# Patient Record
Sex: MALE | URBAN - METROPOLITAN AREA
[De-identification: names, ages, dates, MRNs, and addresses within clinical notes are randomized per-mention and may not be internally consistent; named-entity substitution may affect disease eponyms.]

---

## 2020-09-14 ENCOUNTER — WALK IN (OUTPATIENT)
Dept: URGENT CARE | Age: 45
End: 2020-09-14

## 2020-09-14 VITALS
SYSTOLIC BLOOD PRESSURE: 123 MMHG | HEART RATE: 72 BPM | RESPIRATION RATE: 16 BRPM | HEIGHT: 71 IN | OXYGEN SATURATION: 98 % | DIASTOLIC BLOOD PRESSURE: 82 MMHG | TEMPERATURE: 97.5 F

## 2020-09-14 DIAGNOSIS — R07.9 CHEST PAIN, UNSPECIFIED TYPE: Primary | ICD-10-CM

## 2020-09-14 PROCEDURE — 99203 OFFICE O/P NEW LOW 30 MIN: CPT | Performed by: NURSE PRACTITIONER

## 2020-09-14 PROCEDURE — 93000 ELECTROCARDIOGRAM COMPLETE: CPT | Performed by: NURSE PRACTITIONER

## 2020-09-14 SDOH — HEALTH STABILITY: MENTAL HEALTH: HOW OFTEN DO YOU HAVE A DRINK CONTAINING ALCOHOL?: 2-3 TIMES A WEEK

## 2020-09-14 SDOH — HEALTH STABILITY: MENTAL HEALTH: HOW MANY STANDARD DRINKS CONTAINING ALCOHOL DO YOU HAVE ON A TYPICAL DAY?: 1 OR 2

## 2020-09-14 SDOH — HEALTH STABILITY: MENTAL HEALTH: HOW OFTEN DO YOU HAVE 6 OR MORE DRINKS ON ONE OCCASION?: NEVER

## 2020-09-14 ASSESSMENT — ENCOUNTER SYMPTOMS: HEADACHES: 1

## 2020-09-15 ENCOUNTER — TELEPHONE (OUTPATIENT)
Dept: URGENT CARE | Age: 45
End: 2020-09-15

## 2021-02-27 ENCOUNTER — WALK IN (OUTPATIENT)
Dept: URGENT CARE | Age: 46
End: 2021-02-27

## 2021-02-27 DIAGNOSIS — R09.81 NASAL CONGESTION: ICD-10-CM

## 2021-02-27 DIAGNOSIS — J01.40 SUBACUTE PANSINUSITIS: Primary | ICD-10-CM

## 2021-02-27 PROCEDURE — U0003 INFECTIOUS AGENT DETECTION BY NUCLEIC ACID (DNA OR RNA); SEVERE ACUTE RESPIRATORY SYNDROME CORONAVIRUS 2 (SARS-COV-2) (CORONAVIRUS DISEASE [COVID-19]), AMPLIFIED PROBE TECHNIQUE, MAKING USE OF HIGH THROUGHPUT TECHNOLOGIES AS DESCRIBED BY CMS-2020-01-R: HCPCS | Performed by: NURSE PRACTITIONER

## 2021-02-27 PROCEDURE — U0005 INFEC AGEN DETEC AMPLI PROBE: HCPCS | Performed by: NURSE PRACTITIONER

## 2021-02-27 PROCEDURE — 99214 OFFICE O/P EST MOD 30 MIN: CPT | Performed by: NURSE PRACTITIONER

## 2021-02-27 RX ORDER — AMOXICILLIN AND CLAVULANATE POTASSIUM 875; 125 MG/1; MG/1
1 TABLET, FILM COATED ORAL EVERY 12 HOURS
Qty: 20 TABLET | Refills: 0 | Status: SHIPPED | OUTPATIENT
Start: 2021-02-27

## 2021-02-27 SDOH — HEALTH STABILITY: MENTAL HEALTH: HOW MANY STANDARD DRINKS CONTAINING ALCOHOL DO YOU HAVE ON A TYPICAL DAY?: 1 OR 2

## 2021-02-27 SDOH — HEALTH STABILITY: MENTAL HEALTH: HOW OFTEN DO YOU HAVE A DRINK CONTAINING ALCOHOL?: 2-3 TIMES A WEEK

## 2021-02-27 SDOH — HEALTH STABILITY: MENTAL HEALTH: HOW OFTEN DO YOU HAVE 6 OR MORE DRINKS ON ONE OCCASION?: NEVER

## 2021-02-27 ASSESSMENT — ENCOUNTER SYMPTOMS
EYE ITCHING: 0
CHEST TIGHTNESS: 0
ANOREXIA: 0
TREMORS: 0
SWOLLEN GLANDS: 0
BACK PAIN: 0
CHOKING: 0
EYE PAIN: 0
WEAKNESS: 0
ABDOMINAL PAIN: 0
EYE DISCHARGE: 0
EYE REDNESS: 0
FEVER: 0
NAUSEA: 0
UNEXPECTED WEIGHT CHANGE: 0
HEADACHES: 0
ANAL BLEEDING: 0
FACIAL SWELLING: 0
VERTIGO: 0
DIZZINESS: 0
APPETITE CHANGE: 0
SORE THROAT: 0
CHANGE IN BOWEL HABIT: 0
DIAPHORESIS: 0
VISUAL CHANGE: 0
COLOR CHANGE: 0
COUGH: 0
CHILLS: 0
DIARRHEA: 0
ACTIVITY CHANGE: 0
TROUBLE SWALLOWING: 0
STRIDOR: 0
RECTAL PAIN: 0
CONFUSION: 0
VOICE CHANGE: 0
NUMBNESS: 0
AGITATION: 0
ABDOMINAL DISTENTION: 0
BLOOD IN STOOL: 0
CONSTIPATION: 0
FATIGUE: 0
SINUS PAIN: 1
WHEEZING: 0
APNEA: 0
VOMITING: 0
RHINORRHEA: 1
SHORTNESS OF BREATH: 0
SINUS PRESSURE: 0

## 2021-02-27 ASSESSMENT — PAIN SCALES - GENERAL: PAINLEVEL: 0

## 2021-02-28 LAB
SARS-COV-2 RNA RESP QL NAA+PROBE: NOT DETECTED
SERVICE CMNT-IMP: NORMAL
SERVICE CMNT-IMP: NORMAL

## 2021-03-01 ENCOUNTER — TELEPHONE (OUTPATIENT)
Dept: URGENT CARE | Age: 46
End: 2021-03-01

## 2021-04-15 ENCOUNTER — OFFICE VISIT (OUTPATIENT)
Dept: FAMILY MEDICINE CLINIC | Facility: CLINIC | Age: 46
End: 2021-04-15
Payer: COMMERCIAL

## 2021-04-15 VITALS
HEIGHT: 70.47 IN | SYSTOLIC BLOOD PRESSURE: 110 MMHG | RESPIRATION RATE: 17 BRPM | BODY MASS INDEX: 33.13 KG/M2 | TEMPERATURE: 99 F | HEART RATE: 102 BPM | DIASTOLIC BLOOD PRESSURE: 88 MMHG | WEIGHT: 234 LBS

## 2021-04-15 DIAGNOSIS — M99.01 CERVICOTHORACIC SOMATIC DYSFUNCTION: ICD-10-CM

## 2021-04-15 DIAGNOSIS — R09.81 NASAL SINUS CONGESTION: ICD-10-CM

## 2021-04-15 DIAGNOSIS — J30.9 ALLERGIC RHINITIS, UNSPECIFIED SEASONALITY, UNSPECIFIED TRIGGER: ICD-10-CM

## 2021-04-15 DIAGNOSIS — R05.8 ALLERGIC COUGH: ICD-10-CM

## 2021-04-15 DIAGNOSIS — M99.03 SOMATIC DYSFUNCTION OF LUMBAR REGION: ICD-10-CM

## 2021-04-15 DIAGNOSIS — J45.40 MODERATE PERSISTENT REACTIVE AIRWAY DISEASE WITHOUT COMPLICATION: Primary | ICD-10-CM

## 2021-04-15 PROBLEM — Z76.89 ESTABLISHING CARE WITH NEW DOCTOR, ENCOUNTER FOR: Status: ACTIVE | Noted: 2021-04-15

## 2021-04-15 PROCEDURE — 3074F SYST BP LT 130 MM HG: CPT | Performed by: FAMILY MEDICINE

## 2021-04-15 PROCEDURE — 99204 OFFICE O/P NEW MOD 45 MIN: CPT | Performed by: FAMILY MEDICINE

## 2021-04-15 PROCEDURE — 98926 OSTEOPATH MANJ 3-4 REGIONS: CPT | Performed by: FAMILY MEDICINE

## 2021-04-15 PROCEDURE — 3079F DIAST BP 80-89 MM HG: CPT | Performed by: FAMILY MEDICINE

## 2021-04-15 PROCEDURE — 3008F BODY MASS INDEX DOCD: CPT | Performed by: FAMILY MEDICINE

## 2021-04-15 RX ORDER — ALBUTEROL SULFATE 2.5 MG/3ML
2.5 SOLUTION RESPIRATORY (INHALATION) EVERY 6 HOURS PRN
Qty: 1 BOX | Refills: 5 | Status: SHIPPED | OUTPATIENT
Start: 2021-04-15

## 2021-04-15 RX ORDER — DEXTROMETHORPHAN HYDROBROMIDE AND PROMETHAZINE HYDROCHLORIDE 15; 6.25 MG/5ML; MG/5ML
5 SYRUP ORAL 4 TIMES DAILY PRN
Qty: 240 ML | Refills: 0 | Status: SHIPPED | OUTPATIENT
Start: 2021-04-15 | End: 2021-04-24

## 2021-04-15 RX ORDER — FLUTICASONE PROPIONATE 50 MCG
2 SPRAY, SUSPENSION (ML) NASAL DAILY
COMMUNITY
End: 2021-05-12

## 2021-04-15 RX ORDER — METHYLPREDNISOLONE 4 MG/1
TABLET ORAL
Qty: 1 KIT | Refills: 0 | Status: SHIPPED | OUTPATIENT
Start: 2021-04-15 | End: 2021-05-24 | Stop reason: ALTCHOICE

## 2021-04-15 RX ORDER — ASPIRIN 81 MG/1
81 TABLET ORAL DAILY
COMMUNITY

## 2021-04-15 NOTE — PROGRESS NOTES
HPI/Subjective:   Patient ID: Estela Rosales is a 39year old male.     This patient is a 72-year-old -American male who is presenting for establishing care with a new physician with unfortunately a recent onset of allergy and sinus symptoms which inclu Normal breath sounds. Musculoskeletal:      Cervical back: Spasms and tenderness present. Decreased range of motion. Thoracic back: Spasms and tenderness present. Decreased range of motion. Lumbar back: Spasms and tenderness present.  Decreased All adult screening ordered and done appropriate for patient's age and gender and risk factors and complaints. Medication reviewed and renewed where needed and appropriate. Comply with medications. Monitor blood pressures and record at home.  Limit sal

## 2021-04-16 ENCOUNTER — PATIENT MESSAGE (OUTPATIENT)
Dept: FAMILY MEDICINE CLINIC | Facility: CLINIC | Age: 46
End: 2021-04-16

## 2021-04-18 NOTE — TELEPHONE ENCOUNTER
Please advise. Rx pended. LOV 4/15/21    From: [facility]  To:  Iker Lu DO  Sent: 4/16/2021  4:50 PM CDT  Subject: Prescription Question    Good afternoon Dr. Petra Green    I'm a leaving for a couple of days and didn't want to take the Kids neb

## 2021-04-19 RX ORDER — NEBULIZER ACCESSORIES
KIT MISCELLANEOUS
Qty: 1 KIT | Refills: 0 | Status: SHIPPED | OUTPATIENT
Start: 2021-04-19

## 2021-04-24 ENCOUNTER — LAB ENCOUNTER (OUTPATIENT)
Dept: LAB | Age: 46
End: 2021-04-24
Attending: FAMILY MEDICINE
Payer: COMMERCIAL

## 2021-04-24 DIAGNOSIS — J45.40 MODERATE PERSISTENT REACTIVE AIRWAY DISEASE WITHOUT COMPLICATION: ICD-10-CM

## 2021-04-24 DIAGNOSIS — R05.8 ALLERGIC COUGH: ICD-10-CM

## 2021-04-25 RX ORDER — DEXTROMETHORPHAN HYDROBROMIDE AND PROMETHAZINE HYDROCHLORIDE 15; 6.25 MG/5ML; MG/5ML
5 SYRUP ORAL 4 TIMES DAILY PRN
Qty: 240 ML | Refills: 0 | Status: SHIPPED | OUTPATIENT
Start: 2021-04-25

## 2021-04-27 ENCOUNTER — HOSPITAL ENCOUNTER (OUTPATIENT)
Dept: RESPIRATORY THERAPY | Facility: HOSPITAL | Age: 46
Discharge: HOME | End: 2021-04-27
Attending: FAMILY MEDICINE
Payer: COMMERCIAL

## 2021-04-27 DIAGNOSIS — J45.40 MODERATE PERSISTENT REACTIVE AIRWAY DISEASE WITHOUT COMPLICATION: ICD-10-CM

## 2021-04-27 PROCEDURE — 94726 PLETHYSMOGRAPHY LUNG VOLUMES: CPT | Performed by: INTERNAL MEDICINE

## 2021-04-27 PROCEDURE — 94729 DIFFUSING CAPACITY: CPT | Performed by: INTERNAL MEDICINE

## 2021-04-27 PROCEDURE — 94060 EVALUATION OF WHEEZING: CPT | Performed by: INTERNAL MEDICINE

## 2021-05-04 NOTE — ADDENDUM NOTE
Encounter addended by: Renzo Rider MD on: 5/4/2021 2:48 PM   Actions taken: Clinical Note Signed, Charge Capture section accepted

## 2021-05-04 NOTE — PROCEDURES
[facility]    PFT Interpretation    Irineo Hurst     1975 MRN [id number]   Height  71 inh  Age 39year old   Weight  234 lbs  Sex Male         Spirometry:   FEV1 2.89 L which is 73%  FEV1/FVC ratio 77%  No significant bronc

## 2021-05-12 ENCOUNTER — OFFICE VISIT (OUTPATIENT)
Dept: ALLERGY | Facility: CLINIC | Age: 46
End: 2021-05-12
Payer: COMMERCIAL

## 2021-05-12 ENCOUNTER — NURSE ONLY (OUTPATIENT)
Dept: ALLERGY | Facility: CLINIC | Age: 46
End: 2021-05-12
Payer: COMMERCIAL

## 2021-05-12 VITALS
SYSTOLIC BLOOD PRESSURE: 158 MMHG | DIASTOLIC BLOOD PRESSURE: 108 MMHG | OXYGEN SATURATION: 93 % | RESPIRATION RATE: 17 BRPM | HEART RATE: 79 BPM

## 2021-05-12 DIAGNOSIS — J45.40 MODERATE PERSISTENT EXTRINSIC ASTHMA WITHOUT COMPLICATION: ICD-10-CM

## 2021-05-12 DIAGNOSIS — J30.2 PERENNIAL ALLERGIC RHINITIS WITH SEASONAL VARIATION: Primary | ICD-10-CM

## 2021-05-12 DIAGNOSIS — J30.89 ENVIRONMENTAL AND SEASONAL ALLERGIES: ICD-10-CM

## 2021-05-12 DIAGNOSIS — J30.89 PERENNIAL ALLERGIC RHINITIS WITH SEASONAL VARIATION: Primary | ICD-10-CM

## 2021-05-12 PROCEDURE — 99244 OFF/OP CNSLTJ NEW/EST MOD 40: CPT | Performed by: ALLERGY & IMMUNOLOGY

## 2021-05-12 PROCEDURE — 3080F DIAST BP >= 90 MM HG: CPT | Performed by: ALLERGY & IMMUNOLOGY

## 2021-05-12 PROCEDURE — 95024 IQ TESTS W/ALLERGENIC XTRCS: CPT | Performed by: ALLERGY & IMMUNOLOGY

## 2021-05-12 PROCEDURE — 3077F SYST BP >= 140 MM HG: CPT | Performed by: ALLERGY & IMMUNOLOGY

## 2021-05-12 PROCEDURE — 95004 PERQ TESTS W/ALRGNC XTRCS: CPT | Performed by: ALLERGY & IMMUNOLOGY

## 2021-05-12 RX ORDER — LEVOCETIRIZINE DIHYDROCHLORIDE 5 MG/1
5 TABLET, FILM COATED ORAL NIGHTLY
Qty: 30 TABLET | Refills: 0 | Status: SHIPPED | OUTPATIENT
Start: 2021-05-12

## 2021-05-12 RX ORDER — PREDNISONE 20 MG/1
TABLET ORAL
Qty: 10 TABLET | Refills: 0 | Status: SHIPPED | OUTPATIENT
Start: 2021-05-12 | End: 2021-05-24 | Stop reason: ALTCHOICE

## 2021-05-12 RX ORDER — ALBUTEROL SULFATE 90 UG/1
2 AEROSOL, METERED RESPIRATORY (INHALATION) EVERY 6 HOURS PRN
Qty: 1 INHALER | Refills: 0 | Status: SHIPPED | OUTPATIENT
Start: 2021-05-12 | End: 2021-11-22

## 2021-05-12 RX ORDER — FLUTICASONE PROPIONATE 50 MCG
2 SPRAY, SUSPENSION (ML) NASAL DAILY
Qty: 3 BOTTLE | Refills: 0 | Status: SHIPPED | OUTPATIENT
Start: 2021-05-12

## 2021-05-12 RX ORDER — FLUTICASONE FUROATE AND VILANTEROL TRIFENATATE 100; 25 UG/1; UG/1
1 POWDER RESPIRATORY (INHALATION) DAILY
Qty: 1 EACH | Refills: 0 | Status: SHIPPED | OUTPATIENT
Start: 2021-05-12 | End: 2021-08-12

## 2021-05-12 NOTE — PATIENT INSTRUCTIONS
1.  Coughing wheezing chest tightness  Suspect asthmatic component  Patient reporting daily symptoms  Nebulizer at home. No albuterol inhaler. Recommend a trial of ICS/LABA with breo 100/25 1 puff once a day to prevent asthma symptoms.   Rinse mouth or br

## 2021-05-24 ENCOUNTER — OFFICE VISIT (OUTPATIENT)
Dept: FAMILY MEDICINE CLINIC | Facility: CLINIC | Age: 46
End: 2021-05-24
Payer: COMMERCIAL

## 2021-05-24 VITALS — SYSTOLIC BLOOD PRESSURE: 122 MMHG | DIASTOLIC BLOOD PRESSURE: 84 MMHG

## 2021-05-24 DIAGNOSIS — Z88.9 MULTIPLE ALLERGIES: ICD-10-CM

## 2021-05-24 DIAGNOSIS — J45.40 MODERATE PERSISTENT REACTIVE AIRWAY DISEASE WITHOUT COMPLICATION: Primary | ICD-10-CM

## 2021-05-24 PROBLEM — R06.00 DYSPNEA: Status: ACTIVE | Noted: 2017-12-18

## 2021-05-24 PROCEDURE — 3074F SYST BP LT 130 MM HG: CPT | Performed by: FAMILY MEDICINE

## 2021-05-24 PROCEDURE — 3079F DIAST BP 80-89 MM HG: CPT | Performed by: FAMILY MEDICINE

## 2021-05-24 PROCEDURE — 99214 OFFICE O/P EST MOD 30 MIN: CPT | Performed by: FAMILY MEDICINE

## 2021-05-24 NOTE — PROGRESS NOTES
HPI/Subjective:   Patient ID: Eva Mccormick is a 39year old male.     This patient is a 27-year-old -American male who was doing a follow-up after being seen by an allergist and also having a PFT done to determine the status of his asthma as w Exam  Constitutional:       General: He is not in acute distress. Appearance: Normal appearance. Cardiovascular:      Rate and Rhythm: Normal rate and regular rhythm. Pulmonary:      Effort: Pulmonary effort is normal. No respiratory distress.

## 2021-05-24 NOTE — PATIENT INSTRUCTIONS
Patient has been encouraged to continue with Breo and Xyzal.  It has been explained to the patient that Jolie Fails is for preventative/maintenance care and should be done daily.   Keep all allergy appointments as recommended by the allergist.

## 2021-05-25 ENCOUNTER — TELEPHONE (OUTPATIENT)
Dept: ALLERGY | Facility: CLINIC | Age: 46
End: 2021-05-25

## 2021-05-25 NOTE — TELEPHONE ENCOUNTER
I would prefer for patient to purchase Xyzal over-the-counter rather than Clarinex as it feels sizable stronger. If patient still prefers Clarinex I am happy to place a prescription.   Xyzal can be purchased over-the-counter with best prices  at [facility]

## 2021-05-25 NOTE — TELEPHONE ENCOUNTER
[facility] in [facility] sent fax stating . . .    Plan does not cover Levocetirizine. Per drug formulary Clarinex is covered. Please advise.

## 2021-05-25 NOTE — TELEPHONE ENCOUNTER
Discussed with patient. He verbalizes his understanding and reports he was already buying OTC. No further action needed at this time.

## 2021-05-26 VITALS
SYSTOLIC BLOOD PRESSURE: 134 MMHG | RESPIRATION RATE: 16 BRPM | DIASTOLIC BLOOD PRESSURE: 90 MMHG | WEIGHT: 239.5 LBS | TEMPERATURE: 97 F | BODY MASS INDEX: 33.4 KG/M2 | HEART RATE: 87 BPM | OXYGEN SATURATION: 97 %

## 2021-07-10 ENCOUNTER — TELEPHONE (OUTPATIENT)
Dept: FAMILY MEDICINE CLINIC | Facility: CLINIC | Age: 46
End: 2021-07-10

## 2021-07-10 ENCOUNTER — PATIENT MESSAGE (OUTPATIENT)
Dept: FAMILY MEDICINE CLINIC | Facility: CLINIC | Age: 46
End: 2021-07-10

## 2021-07-10 DIAGNOSIS — T78.40XD ALLERGY, SUBSEQUENT ENCOUNTER: Primary | ICD-10-CM

## 2021-07-11 NOTE — TELEPHONE ENCOUNTER
See telephone encounter. From: Theodore Shen  To:  Yousuf Long DO  Sent: 7/10/2021  8:26 PM CDT  Subject: Prescription Lara Ro   I am starting to get back where I was when I started with you, coughing and very congeste

## 2021-07-11 NOTE — TELEPHONE ENCOUNTER
Please call Pt to triage. Thanks  Last seen for same symptoms on 4/15/21   LOV 5/24/21 Asthma      ----- Message from Janette Vivar sent at 7/10/2021  8:26 PM CDT -----  Regarding: Prescription Question  Contact: [phone]  Aneesh Doyle   I am star

## 2021-07-14 RX ORDER — METHYLPREDNISOLONE 4 MG/1
TABLET ORAL
Qty: 21 EACH | Refills: 0 | Status: SHIPPED | OUTPATIENT
Start: 2021-07-14 | End: 2021-11-22

## 2021-07-14 NOTE — TELEPHONE ENCOUNTER
Spoke with patient ( verified) and relayed Dr. Ashkan Griffin message below--patient verbalizes understanding and agreement. No further questions/concerns at this time.

## 2021-07-14 NOTE — TELEPHONE ENCOUNTER
Pt was called. States still feels congested. Denies fever, headaches, shortness of breath. Pt states he continues to take allergy medications and drinking plenty of water.   Pt states he found out he is allergic to dogs, was given medrol dose pack months a

## 2021-08-12 RX ORDER — FLUTICASONE FUROATE AND VILANTEROL TRIFENATATE 100; 25 UG/1; UG/1
1 POWDER RESPIRATORY (INHALATION) DAILY
Qty: 1 EACH | Refills: 0 | Status: SHIPPED | OUTPATIENT
Start: 2021-08-12 | End: 2021-11-22

## 2021-08-12 NOTE — TELEPHONE ENCOUNTER
Patient last seen in Allergy 5/12/2021 for . . . Perennial allergic rhinitis with seasonal variation  (primary encounter diagnosis)  Moderate persistent extrinsic asthma without complication    Refill request received for .  . .    Fluticasone Furoate-Vi

## 2021-08-13 NOTE — TELEPHONE ENCOUNTER
RN called pt to notify him of RX refill and need for follow up. Pt verbalized understanding and reports he will call back to schedule a follow up appointment.

## 2021-11-18 RX ORDER — FLUTICASONE FUROATE AND VILANTEROL TRIFENATATE 100; 25 UG/1; UG/1
1 POWDER RESPIRATORY (INHALATION) DAILY
Qty: 60 EACH | Refills: 0 | OUTPATIENT
Start: 2021-11-18

## 2021-11-18 NOTE — TELEPHONE ENCOUNTER
Patient contacted via telephone. Patient informed that Rx for Breo-Ellipta was denied. Patient informed he was to make a f/u appt 8/2021.      Patient states that he will call PCP for a refill of Breo-Ellipta, will call back to schedule an Allergy f/u

## 2021-11-18 NOTE — TELEPHONE ENCOUNTER
Refill requested for    Name from pharmacy: [facility]aspreet 100-25MCG ORAL INH(30)         Will file in chart as: BREO ELLIPTA 100-25 MCG/INH Inhalation Aerosol Powder, Breath Activated    Sig: Inhale 1 puff into the lungs daily.     Original sig: INHALE 1 PUF

## 2021-11-22 ENCOUNTER — TELEPHONE (OUTPATIENT)
Dept: ALLERGY | Facility: CLINIC | Age: 46
End: 2021-11-22

## 2021-11-22 ENCOUNTER — VIRTUAL PHONE E/M (OUTPATIENT)
Dept: ALLERGY | Facility: CLINIC | Age: 46
End: 2021-11-22

## 2021-11-22 DIAGNOSIS — J30.2 PERENNIAL ALLERGIC RHINITIS WITH SEASONAL VARIATION: ICD-10-CM

## 2021-11-22 DIAGNOSIS — Z23 FLU VACCINE NEED: ICD-10-CM

## 2021-11-22 DIAGNOSIS — J45.40 MODERATE PERSISTENT EXTRINSIC ASTHMA WITHOUT COMPLICATION: Primary | ICD-10-CM

## 2021-11-22 DIAGNOSIS — J30.89 PERENNIAL ALLERGIC RHINITIS WITH SEASONAL VARIATION: ICD-10-CM

## 2021-11-22 DIAGNOSIS — Z28.21 COVID-19 VACCINE DOSE DECLINED: ICD-10-CM

## 2021-11-22 PROCEDURE — 99214 OFFICE O/P EST MOD 30 MIN: CPT | Performed by: ALLERGY & IMMUNOLOGY

## 2021-11-22 RX ORDER — FLUTICASONE FUROATE AND VILANTEROL TRIFENATATE 100; 25 UG/1; UG/1
1 POWDER RESPIRATORY (INHALATION) DAILY
Qty: 3 EACH | Refills: 1 | Status: SHIPPED | OUTPATIENT
Start: 2021-11-22

## 2021-11-22 RX ORDER — ALBUTEROL SULFATE 90 UG/1
2 AEROSOL, METERED RESPIRATORY (INHALATION) EVERY 6 HOURS PRN
Qty: 1 EACH | Refills: 0 | Status: SHIPPED | OUTPATIENT
Start: 2021-11-22

## 2021-11-22 NOTE — TELEPHONE ENCOUNTER
Pt calling for refill on Rx Breo Ellipta . Pt stts out of medication.  Please advise     Current Outpatient Medications   Medication Sig Dispense Refill   • Fluticasone Furoate-Vilanterol (BREO ELLIPTA) 100-25 MCG/INH Inhalation Aerosol Powder, Breath Activ

## 2021-11-22 NOTE — TELEPHONE ENCOUNTER
Refill requested for Pt calling for refill on Rx Breo Ellipta . Pt stts out of medication.  Please advise             Current Outpatient Medications   Medication Sig Dispense Refill   • Fluticasone Furoate-Vilanterol (BREO ELLIPTA) 100-25 MCG/INH Inhalation

## 2021-11-22 NOTE — PROGRESS NOTES
Seth Aviles is a 55year old male. HPI:   No chief complaint on file. Patient is a 42-year-old male who presents for follow-up via telephone visit.     Virtual/Telephone Check-In    Seth Aviles verbally consents to a Virtual/Telephone Base) MCG/ACT Inhalation Aero Soln Inhale 2 puffs into the lungs every 6 (six) hours as needed for Wheezing. 1 each 0   • Levocetirizine Dihydrochloride (XYZAL) 5 MG Oral Tab Take 1 tablet (5 mg total) by mouth nightly.  30 tablet 0   • Fluticasone Propiona bilaterally normal respiratory effort   Cardiovascular: regular rate and rhythm no murmurs, gallups, or rubs  Abdomen: soft non-tender non-distended  Skin/Hair: no unusual rashes present   Extremities: no edema, cyanosis, or clubbing     ASSESSMENT/PLAN: patient education and training related to self administration of these medications. Teaching, instruction and sample was provided to the patient by myself. Teaching included  a review of potential adverse side effects as well as potential efficacy.   Tati

## 2021-11-22 NOTE — PATIENT INSTRUCTIONS
#1 asthma  No ED visits or prednisone in the interim. Symptom frequency is 2 to 3 days/week. As well as albuterol usage  Ran out of Breo. Restart Breo 100/25 1 puff once a day to prevent asthma symptoms rinse mouth or brush teeth after using.    Albutero

## 2022-05-26 ENCOUNTER — TELEMEDICINE (OUTPATIENT)
Dept: FAMILY MEDICINE CLINIC | Facility: CLINIC | Age: 47
End: 2022-05-26
Payer: COMMERCIAL

## 2022-05-26 DIAGNOSIS — J45.40 MODERATE PERSISTENT REACTIVE AIRWAY DISEASE WITHOUT COMPLICATION: Primary | ICD-10-CM

## 2022-05-26 DIAGNOSIS — J30.9 ALLERGIC RHINITIS, UNSPECIFIED SEASONALITY, UNSPECIFIED TRIGGER: ICD-10-CM

## 2022-05-26 PROCEDURE — 99214 OFFICE O/P EST MOD 30 MIN: CPT | Performed by: FAMILY MEDICINE

## 2022-05-26 RX ORDER — METHYLPREDNISOLONE 4 MG/1
TABLET ORAL
Qty: 1 EACH | Refills: 0 | Status: SHIPPED | OUTPATIENT
Start: 2022-05-26

## 2022-05-26 RX ORDER — ALBUTEROL SULFATE 90 UG/1
2 AEROSOL, METERED RESPIRATORY (INHALATION) EVERY 6 HOURS PRN
Qty: 2 EACH | Refills: 2 | Status: SHIPPED | OUTPATIENT
Start: 2022-05-26

## 2022-05-26 NOTE — PATIENT INSTRUCTIONS
Medrol Dosepak prescribed along with albuterol rescue inhaler refills sent to pharmacy. Increase water intake and decrease dairy intake. Patient encouraged to have a cleaning service to come into his home if he has the resources to do so or to clean the home thoroughly so that dog dander and hair can also be removed from the ventilatory system in the home, but must be done in masks and protective goggles.

## 2022-11-30 ENCOUNTER — TELEPHONE (OUTPATIENT)
Dept: FAMILY MEDICINE CLINIC | Facility: CLINIC | Age: 47
End: 2022-11-30

## 2022-11-30 ENCOUNTER — PATIENT MESSAGE (OUTPATIENT)
Dept: FAMILY MEDICINE CLINIC | Facility: CLINIC | Age: 47
End: 2022-11-30

## 2022-12-01 NOTE — TELEPHONE ENCOUNTER
See acute encounter 11/30/22      Anjana Santana RN 11/30/2022  9:00 PM CST        ----- Message -----  From: Mario Wheeler  Sent: 11/30/2022   3:46 PM CST  To: Em Rn Triage  Subject: Allergies                                        Hi Doc  I have had a flair up with my Allergies due to being around family with Dogs, I have been trying manage but I am not able to breath out my nose very congested wondering if I could get a refill on the Zpack that you gave me before? It was a 7 day antibiotic I believe.   Thanks in advance

## 2022-12-01 NOTE — TELEPHONE ENCOUNTER
----- Message from Chevy Larkin RN sent at 11/30/2022  9:00 PM CST -----  Regarding: FW: Allergies       ----- Message -----  From: San[place] Client  Sent: 11/30/2022   3:46 PM CST  To: [facility] Rn Triage  Subject: Allergies                                        Hi Doc  I have had a flair up with my Allergies due to being around family with Dogs, I have been trying manage but I am not able to breath out my nose very congested wondering if I could get a refill on the Zpack that you gave me before? It was a 7 day antibiotic I believe.   Thanks in advance

## 2024-10-30 ENCOUNTER — TELEPHONE (OUTPATIENT)
Dept: FAMILY MEDICINE CLINIC | Facility: CLINIC | Age: 49
End: 2024-10-30

## 2024-10-30 NOTE — TELEPHONE ENCOUNTER
Patient's spouse is requesting a hospital follow up appointment with Dr. Palomares    Patient was discharged on 10/24/24 from [facility] in [facility].    Patient's spouse is requesting appointment for this week of 10/30/24    Provider's first available appointment in 12/17/24.    Is Provider able to see patient in date frame requested.

## 2024-10-31 NOTE — TELEPHONE ENCOUNTER
Attempted to reach patient to offer him appt with other available providers , no answer unable to leave a voicemail.

## 2024-11-01 NOTE — TELEPHONE ENCOUNTER
Patient returned the call and advised of message.  Patient declines sooner appointments with other providers.   Patient is asking if he may be scheduled only with Dr. Palomares.

## 2024-11-02 NOTE — TELEPHONE ENCOUNTER
There are no preventive care reminders to display for this patient.    Patient is up to date, no discussion needed.             You can double book this patient for 4:00 on Tuesday, November 5, 2024.  Please remind this patient that he is being double booked and that he may have to wait but he has to be present at 4:00 PM.  Also let the patient know that this is election day, so if he has not vote yet he needs to make sure that he does so so that he is not trying to be rushed through the clinic process in order to go vote.

## 2024-11-04 NOTE — TELEPHONE ENCOUNTER
Future Appointments   Date Time Provider Department Center   2024  4:00 PM Yonathan Palomares,  [facility]   2025  2:20 PM Yonathan Palomares DO [facility]     Called patient's spouse, confirmed patient's name and .    Spouse advised of Dr. Palomares's note.  Patient will come tomorrow at 4pm.

## 2024-11-05 NOTE — PATIENT INSTRUCTIONS
Quitting Smoking    Quitting smoking is the most important step you can take to improve your health. We're glad you have set a goal to improve your health.    Quit Smoking Resources    In addition to medications, use the STAR plan to help you successfully quit.   Stick with your quit date!   Tell friends, family, and coworkers your quit date. Request their understanding and support.  Anticipate and prepare for challenges. Some examples are withdrawal symptoms, being around others who smoke, and drinking alcohol.  Remove all tobacco products and paraphernalia from your environment. Make your home and vehicles smoke-free.    Free resources for additional support:  National tobacco quitline: 1-800-QUIT-NOW (1-366.863.1820).  SmokefreeTXT is a free text program to assist you in quitting. Visit https://www.smokefree.gov/smokefreetxt for more information.  Feel free to call your care manager at ([phone]) for additional support.  Patient has been advised that he needs adequate clear water hydration along with uninterrupted rest preferably 7 to 8 hours per night.  Patient's return to work should be limited to his duties only.  Excessive lifting and twisting should be prohibited.  Driving is okay.  Any sustained chest pain or any sustained exertional fatigue should be treated with immediate rest and a phone call to the physician's office or 911 depending on the quality of the discomfort.

## 2024-11-09 NOTE — PROGRESS NOTES
Subjective:     Patient ID: Darinel Vivar is a 49 year old male.    This patient is a 49-year-old hypertensive/asthmatic/smoker without any apparent family history for CAD -American gentleman who was recently seen in the urgent care setting on October 22, 2024 and determined to have non-STEMI MI which required angioplasty and stent x 1.  Patient was discharged on October 24, 2024 and went back to work in resume all regular activities.  The patient was seen again at [facility] on October 27, 2024 where it was determined that his reoccurring chest pain was not of cardiac etiology.    Patient is here today as a follow-up regarding this chest pain which is no longer sharp.  There are some position and activity mechanics associated with this chest pain.  Deep inspiration does provoke it.    Because of the recent visceral challenge regarding his heart without any attention to his frame and a negative cardiac workup on his second assessment at [facility], it has been explained to him and thought to be body mechanics.  The patient was given an assessment regarding his thoracic spine and was found to have multiple vertebrae rotated out of position and paraspinal spasm which can affect the ribs as they travel around to the anterior chest wall.  Chest discomfort was reproducible with certain positions and compression.    We have discussed the importance of cessation of any and all forms of tobacco use.  Discussion lasted about 5 minutes.    Patient declined on both tetanus and seasonal influenza vaccines which were recommended.    Patient also given a referral so that he can set up for his due colonoscopy.            History/Other:   Review of Systems  Current Outpatient Medications   Medication Sig Dispense Refill    atorvastatin 80 MG Oral Tab Take 1 tablet (80 mg total) by mouth nightly.      metoprolol tartrate 25 MG Oral Tab Take 1 tablet (25 mg total) by mouth 2 (two) times daily.      ticagrelor 90  MG Oral Tab Take 1 tablet (90 mg total) by mouth every 12 (twelve) hours.      albuterol (PROAIR HFA) 108 (90 Base) MCG/ACT Inhalation Aero Soln Inhale 2 puffs into the lungs every 6 (six) hours as needed for Wheezing. 2 each 2    methylPREDNISolone (MEDROL) 4 MG Oral Tablet Therapy Pack As directed. 1 each 0    fluticasone furoate-vilanterol (BREO ELLIPTA) 100-25 MCG/INH Inhalation Aerosol Powder, Breath Activated Inhale 1 puff into the lungs daily. 3 each 1    Levocetirizine Dihydrochloride (XYZAL) 5 MG Oral Tab Take 1 tablet (5 mg total) by mouth nightly. 30 tablet 0    Fluticasone Propionate (FLONASE) 50 MCG/ACT Nasal Suspension 2 sprays by Nasal route daily. 3 Bottle 0    Promethazine-DM 6.25-15 MG/5ML Oral Syrup Take 5 mL by mouth 4 (four) times daily as needed for cough. 240 mL 0    Respiratory Therapy Supplies (NEBULIZER/TUBING/MOUTHPIECE) Does not apply Kit Use every 6 hours as needed 1 kit 0    Nebulizers (NEBULIZER COMPRESSOR) Does not apply Misc Use every 6 hours as needed 1 each 0    aspirin 81 MG Oral Tab EC Take 1 tablet (81 mg total) by mouth daily.      albuterol sulfate (2.5 MG/3ML) 0.083% Inhalation Nebu Soln Take 3 mL (2.5 mg total) by nebulization every 6 (six) hours as needed for Wheezing. 1 Box 5     Allergies:Allergies[1]    No past medical history on file.   No past surgical history on file.   No family history on file.   Social History:   Social History     Socioeconomic History    Marital status:    Tobacco Use    Smoking status: Light Smoker     Types: Cigars    Smokeless tobacco: Never   Vaping Use    Vaping status: Never Used   Substance and Sexual Activity    Drug use: Yes     Types: Cannabis     Social Drivers of Health     Financial Resource Strain: Low Risk  (10/23/2024)    Received from [employer]    Overall Financial Resource Strain (CARDIA)     Difficulty of Paying Living Expenses: Not hard at all   Food Insecurity: No Food Insecurity (10/23/2024)    Received from  [employer]    Hunger Vital Sign     Worried About Running Out of Food in the Last Year: Never true     Ran Out of Food in the Last Year: Never true   Transportation Needs: No Transportation Needs (10/23/2024)    Received from [employer]    PRAPARE - Transportation     Lack of Transportation (Medical): No     Lack of Transportation (Non-Medical): No   Physical Activity: Sufficiently Active (10/23/2024)    Received from [employer]    Exercise Vital Sign     Days of Exercise per Week: 5 days     Minutes of Exercise per Session: 60 min   Stress: No Stress Concern Present (10/23/2024)    Received from [employer]    [employer] [place] [employer] - Occupational Stress Questionnaire     Feeling of Stress : Not at all   Social Connections: Moderately Integrated (10/23/2024)    Received from [employer]    Social Connection and Isolation Panel [NHANES]     Frequency of Communication with Friends and Family: More than three times a week     Frequency of Social Gatherings with Friends and Family: More than three times a week     Attends Evangelical Services: 1 to 4 times per year     Active Member of Clubs or Organizations: No     Attends Club or Organization Meetings: Never     Marital Status:    Housing Stability: Low Risk  (10/23/2024)    Received from [employer]    Housing Stability Vital Sign     Unable to Pay for Housing in the Last Year: No     Number of Times Moved in the Last Year: 1     Homeless in the Last Year: No        Objective:   Vitals:    11/05/24 1716   BP: 102/78   Pulse:    Resp:    Temp:        Physical Exam  Constitutional:       General: He is not in acute distress.     Appearance: Normal appearance. He is not ill-appearing.   HENT:      Head: Normocephalic and atraumatic.      Right Ear: Tympanic membrane normal.      Left Ear: Tympanic membrane normal.      Nose: Nose normal.      Mouth/Throat:      Mouth: Mucous membranes are moist.    Neck:      Thyroid: No thyromegaly.   Cardiovascular:      Rate and Rhythm: Normal rate and regular rhythm.      Heart sounds:      No gallop.   Pulmonary:      Effort: Pulmonary effort is normal.      Breath sounds: Normal breath sounds.   Musculoskeletal:      Thoracic back: Spasms and tenderness present. Decreased range of motion.        Back:       Comments: Paraspinal spasm in the thoracic region as depicted and malrotation of multiple thoracic vertebra.   Neurological:      Mental Status: He is alert and oriented to person, place, and time.         Assessment & Plan:   1. Hospital discharge follow-up  Status improved since discharge from the hospital and status even more improved after osteopathic manipulation performed during this encounter.    2. Coronary artery disease involving native coronary artery of native heart without angina pectoris  Stable.  No angina.    3. NSTEMI (non-ST elevated myocardial infarction) ([facility])  Formal diagnosis from hospitalization.  Stable.  Asymptomatic.    4. Cigar smoker  Education and cessation health information provided for the patient.  - Tobacco Use Counseling 3-10 Min [36245]    5. Somatic dysfunction of thoracic region  Osteopathic manipulation performed in the thoracic region and immediate release and relief obtained.  - OSTEOPATHIC MANIP,1-2 BODY REGN    6. Colon cancer screening  Referred.  - Gastro Referral - [place] ([place])    7. Tetanus, diphtheria, and acellular pertussis (Tdap) vaccination declined  Tdap declined.  - TETANUS, DIPHTHERIA TOXOIDS AND ACELLULAR PERTUSIS VACCINE (TDAP), >7 YEARS, IM USE    8. Influenza vaccination declined by caregiver  Seasonal influenza vaccine declined as well.      Orders Placed This Encounter   Procedures    TETANUS, DIPHTHERIA TOXOIDS AND ACELLULAR PERTUSIS VACCINE (TDAP), >7 YEARS, IM USE    Tobacco Use Counseling 3-10 Min [06103]    OSTEOPATHIC MANIP,1-2 BODY REGN       Meds This Visit:  Requested Prescriptions      No  prescriptions requested or ordered in this encounter       Imaging & Referrals:  TETANUS, DIPHTHERIA TOXOIDS AND ACELLULAR PERTUSIS VACCINE (TDAP), >7 YEARS, IM USE  GASTRO - INTERNAL     Patient Instructions   Quitting Smoking    Quitting smoking is the most important step you can take to improve your health. We're glad you have set a goal to improve your health.    Quit Smoking Resources    In addition to medications, use the STAR plan to help you successfully quit.   Stick with your quit date!   Tell friends, family, and coworkers your quit date. Request their understanding and support.  Anticipate and prepare for challenges. Some examples are withdrawal symptoms, being around others who smoke, and drinking alcohol.  Remove all tobacco products and paraphernalia from your environment. Make your home and vehicles smoke-free.    Free resources for additional support:  National tobacco quitline: 1-800-QUIT-NOW (1-449.752.9169).  SmokefreeTXT is a free text program to assist you in quitting. Visit https://www.[place].gov/smokefreetxt for more information.  Feel free to call your care manager at ([phone]) for additional support.  Patient has been advised that he needs adequate clear water hydration along with uninterrupted rest preferably 7 to 8 hours per night.  Patient's return to work should be limited to his duties only.  Excessive lifting and twisting should be prohibited.  Driving is okay.  Any sustained chest pain or any sustained exertional fatigue should be treated with immediate rest and a phone call to the physician's office or 911 depending on the quality of the discomfort.    Return in about 6 weeks (around 12/17/2024), or if symptoms worsen or fail to improve.         [1]   Allergies  Allergen Reactions    Dog Epithelium HIVES    Scallops HIVES

## 2024-11-15 ENCOUNTER — NURSE ONLY (OUTPATIENT)
Facility: CLINIC | Age: 49
End: 2024-11-15

## 2024-11-15 NOTE — PROGRESS NOTES
Patient did not complete questionnaires, unable to proceed with Telephone Colon Screening. Patient to reschedule Telephone Colon Screening and complete the questionnaires in their entirety and/or schedule Office Visit consultation with available GI provider.

## 2024-11-16 ENCOUNTER — PATIENT MESSAGE (OUTPATIENT)
Dept: FAMILY MEDICINE CLINIC | Facility: CLINIC | Age: 49
End: 2024-11-16